# Patient Record
Sex: FEMALE | Race: WHITE | NOT HISPANIC OR LATINO | Employment: UNEMPLOYED | URBAN - METROPOLITAN AREA
[De-identification: names, ages, dates, MRNs, and addresses within clinical notes are randomized per-mention and may not be internally consistent; named-entity substitution may affect disease eponyms.]

---

## 2017-05-04 ENCOUNTER — TELEPHONE (OUTPATIENT)
Dept: TRANSPLANT | Facility: CLINIC | Age: 42
End: 2017-05-04

## 2017-05-04 NOTE — TELEPHONE ENCOUNTER
Left VMM and contact information following up on Ellie's e-mail to arrange appointments when in town in June.

## 2017-05-09 ENCOUNTER — TELEPHONE (OUTPATIENT)
Dept: TRANSPLANT | Facility: CLINIC | Age: 42
End: 2017-05-09

## 2017-05-09 DIAGNOSIS — E13.9 POST-PANCREATECTOMY DIABETES (H): ICD-10-CM

## 2017-05-09 DIAGNOSIS — Z90.410 POST-PANCREATECTOMY DIABETES (H): ICD-10-CM

## 2017-05-09 DIAGNOSIS — E55.9 VITAMIN D DEFICIENCY, UNSPECIFIED: ICD-10-CM

## 2017-05-09 DIAGNOSIS — K90.9 INTESTINAL MALABSORPTION: ICD-10-CM

## 2017-05-09 DIAGNOSIS — K86.81 EXOCRINE PANCREATIC INSUFFICIENCY: Primary | ICD-10-CM

## 2017-05-09 DIAGNOSIS — E89.1 POST-PANCREATECTOMY DIABETES (H): ICD-10-CM

## 2017-05-09 NOTE — TELEPHONE ENCOUNTER
E-mail to Ellie today.    Ellie~    Appointments set for Wednesday June 28th     Please come fasting for your boost test at 8am. Your appointment with Dr Alegre will be at 9: 40 .All appointments in our new clinic building.    There are 3 attachments to this e-mail.    1 Directions to the new clinic  2 Instructions for your boost test  3 Electronic communication release which I am required to have on record.      Best wishes,  Ludmila

## 2017-05-15 ENCOUNTER — TELEPHONE (OUTPATIENT)
Dept: TRANSPLANT | Facility: CLINIC | Age: 42
End: 2017-05-15

## 2017-05-15 NOTE — TELEPHONE ENCOUNTER
Returned Ellie's call. She asked when her future appointments were scheduled for. She did not see an e-mail sent 5/9/17 and requested I resend all information to her. She stated she would look in her spam box.     Hi Ellie,    I have included three attachments in this e-mail.     1. Your appointments for Wednesday, June 28,2017.  2. Electronic communication release which I am required to have on record.  3. Boost testing instructions.    The link below will help you learn about our [facility] ([facility] and directions to help you in finding how to get there.  [email or web address]    Would you please e-mail back so I know you have received this information.     Thank you.    Jennifer Collins RN BSN  Phone [phone]. Pager: [phone] Toll free [phone]. Fax  Transplant Coordinator for Chronic pancreatitis /[facility]  [address] [place] [address] Room [address]. [place] [address]

## 2017-06-27 ENCOUNTER — TELEPHONE (OUTPATIENT)
Dept: TRANSPLANT | Facility: CLINIC | Age: 42
End: 2017-06-27

## 2017-06-27 NOTE — TELEPHONE ENCOUNTER
line([place]) [facility] calling for Ellie;  Ellie's insurance changed and her visits/appt's are not covered.  I cancelled Ellie's appts for tomorrow 06/28/17

## 2017-12-24 ENCOUNTER — HEALTH MAINTENANCE LETTER (OUTPATIENT)
Age: 42
End: 2017-12-24

## 2020-03-10 ENCOUNTER — HEALTH MAINTENANCE LETTER (OUTPATIENT)
Age: 45
End: 2020-03-10

## 2020-12-20 ENCOUNTER — HEALTH MAINTENANCE LETTER (OUTPATIENT)
Age: 45
End: 2020-12-20

## 2021-02-28 ENCOUNTER — HEALTH MAINTENANCE LETTER (OUTPATIENT)
Age: 46
End: 2021-02-28

## 2021-04-24 ENCOUNTER — HEALTH MAINTENANCE LETTER (OUTPATIENT)
Age: 46
End: 2021-04-24

## 2021-08-08 ENCOUNTER — HEALTH MAINTENANCE LETTER (OUTPATIENT)
Age: 46
End: 2021-08-08

## 2021-10-03 ENCOUNTER — HEALTH MAINTENANCE LETTER (OUTPATIENT)
Age: 46
End: 2021-10-03

## 2021-11-28 ENCOUNTER — HEALTH MAINTENANCE LETTER (OUTPATIENT)
Age: 46
End: 2021-11-28

## 2022-02-18 DIAGNOSIS — Z00.6 EXAM FOR CLINICAL RESEARCH: Primary | ICD-10-CM

## 2022-03-20 ENCOUNTER — HEALTH MAINTENANCE LETTER (OUTPATIENT)
Age: 47
End: 2022-03-20

## 2022-04-22 RX ORDER — NICOTINE POLACRILEX 4 MG
15 LOZENGE BUCCAL
Status: CANCELLED | OUTPATIENT
Start: 2022-04-22

## 2022-05-09 ENCOUNTER — ALLIED HEALTH/NURSE VISIT (OUTPATIENT)
Dept: OPHTHALMOLOGY | Facility: CLINIC | Age: 47
End: 2022-05-09
Attending: OPHTHALMOLOGY

## 2022-05-09 ENCOUNTER — HOSPITAL ENCOUNTER (OUTPATIENT)
Dept: RESEARCH | Facility: CLINIC | Age: 47
Discharge: HOME | End: 2022-05-09
Attending: PEDIATRICS

## 2022-05-09 ENCOUNTER — LAB REQUISITION (OUTPATIENT)
Dept: LAB | Facility: CLINIC | Age: 47
End: 2022-05-09

## 2022-05-09 ENCOUNTER — OFFICE VISIT (OUTPATIENT)
Dept: NEUROLOGY | Facility: CLINIC | Age: 47
End: 2022-05-09

## 2022-05-09 DIAGNOSIS — E89.1 POST-PANCREATECTOMY DIABETES (H): ICD-10-CM

## 2022-05-09 DIAGNOSIS — E10.65 TYPE 1 DIABETES MELLITUS WITH HYPERGLYCEMIA (H): ICD-10-CM

## 2022-05-09 DIAGNOSIS — E89.1 POST-PANCREATECTOMY DIABETES (H): Primary | ICD-10-CM

## 2022-05-09 DIAGNOSIS — E13.9 POST-PANCREATECTOMY DIABETES (H): ICD-10-CM

## 2022-05-09 DIAGNOSIS — Z00.6 EXAMINATION OF PARTICIPANT OR CONTROL IN CLINICAL RESEARCH: Primary | ICD-10-CM

## 2022-05-09 DIAGNOSIS — Z90.410 POST-PANCREATECTOMY DIABETES (H): ICD-10-CM

## 2022-05-09 DIAGNOSIS — E13.9 POST-PANCREATECTOMY DIABETES (H): Primary | ICD-10-CM

## 2022-05-09 DIAGNOSIS — Z90.410 POST-PANCREATECTOMY DIABETES (H): Primary | ICD-10-CM

## 2022-05-09 DIAGNOSIS — Z00.6 EXAM FOR CLINICAL RESEARCH: Primary | ICD-10-CM

## 2022-05-09 LAB
ALBUMIN SERPL-MCNC: 3.8 G/DL (ref 3.4–5)
ALP SERPL-CCNC: 85 U/L (ref 40–150)
ALT SERPL W P-5'-P-CCNC: 58 U/L (ref 0–50)
ANION GAP SERPL CALCULATED.3IONS-SCNC: 5 MMOL/L (ref 3–14)
AST SERPL W P-5'-P-CCNC: 30 U/L (ref 0–45)
BILIRUB SERPL-MCNC: 0.7 MG/DL (ref 0.2–1.3)
BUN SERPL-MCNC: 12 MG/DL (ref 7–30)
CALCIUM SERPL-MCNC: 9.5 MG/DL (ref 8.5–10.1)
CHLORIDE BLD-SCNC: 110 MMOL/L (ref 94–109)
CO2 SERPL-SCNC: 26 MMOL/L (ref 20–32)
CREAT SERPL-MCNC: 0.6 MG/DL (ref 0.52–1.04)
ERYTHROCYTE [DISTWIDTH] IN BLOOD BY AUTOMATED COUNT: 12.9 % (ref 10–15)
GFR SERPL CREATININE-BSD FRML MDRD: >90 ML/MIN/1.73M2
GLUCOSE BLD-MCNC: 156 MG/DL (ref 70–99)
HCT VFR BLD AUTO: 43.3 % (ref 35–47)
HGB BLD-MCNC: 14.3 G/DL (ref 11.7–15.7)
MCH RBC QN AUTO: 31 PG (ref 26.5–33)
MCHC RBC AUTO-ENTMCNC: 33 G/DL (ref 31.5–36.5)
MCV RBC AUTO: 94 FL (ref 78–100)
PLATELET # BLD AUTO: 376 10E3/UL (ref 150–450)
POTASSIUM BLD-SCNC: 4 MMOL/L (ref 3.4–5.3)
PROT SERPL-MCNC: 7.6 G/DL (ref 6.8–8.8)
RBC # BLD AUTO: 4.62 10E6/UL (ref 3.8–5.2)
SODIUM SERPL-SCNC: 141 MMOL/L (ref 133–144)
WBC # BLD AUTO: 6.2 10E3/UL (ref 4–11)

## 2022-05-09 PROCEDURE — 510N000019 HC RESEARCH MMTT, PER HOUR

## 2022-05-09 PROCEDURE — 510N000017 HC CRU PATIENT CARE, PER 15 MIN

## 2022-05-09 PROCEDURE — 99207 PR NO CHARGE COORDINATED CARE PS: CPT

## 2022-05-09 PROCEDURE — 99207 PR SATISFY VISIT NUMBER: CPT | Performed by: PSYCHIATRY & NEUROLOGY

## 2022-05-09 PROCEDURE — 250N000012 HC RX MED GY IP 250 OP 636 PS 637: Performed by: PEDIATRICS

## 2022-05-09 PROCEDURE — 96374 THER/PROPH/DIAG INJ IV PUSH: CPT

## 2022-05-09 PROCEDURE — 80053 COMPREHEN METABOLIC PANEL: CPT | Performed by: PEDIATRICS

## 2022-05-09 PROCEDURE — 96375 TX/PRO/DX INJ NEW DRUG ADDON: CPT

## 2022-05-09 PROCEDURE — 510N000016 HC RESEARCH MEALS, PER MEAL

## 2022-05-09 PROCEDURE — 85027 COMPLETE CBC AUTOMATED: CPT | Performed by: PEDIATRICS

## 2022-05-09 PROCEDURE — 510N000009 HC RESEARCH FACILITY, PER 15 MIN

## 2022-05-09 RX ORDER — LANCETS
EACH MISCELLANEOUS
Qty: 200 EACH | Refills: 1 | Status: SHIPPED | OUTPATIENT
Start: 2022-05-09

## 2022-05-09 RX ORDER — NICOTINE POLACRILEX 4 MG
15 LOZENGE BUCCAL
Status: DISCONTINUED | OUTPATIENT
Start: 2022-05-09 | End: 2022-05-10 | Stop reason: HOSPADM

## 2022-05-09 RX ADMIN — HUMAN INSULIN 3 UNITS: 100 INJECTION, SOLUTION SUBCUTANEOUS at 08:12

## 2022-05-09 NOTE — PATIENT INSTRUCTIONS
We are aiming to have your blood sugar between  mg/dL tomorrow morning for the start of the mixed meal testing.    Here is our plan for today:  1)  Take 16 units of your Basaglar at your usual time tonight.  2)  Just before your bedtime snack, take your blood sugar and use the correction scale below if your number is above 200 mg/dL.    3)  Recheck your blood sugar about 4 hours later and use this scale again if your blood sugar is above 200 mg/dL.    Here is the scale for the fast acting (novolog/humalog):  Blood sugar 200- 250 mg/dL take 1 unit  Blood sugar 251-350 mg/dL, take 2 units  Blood sugar >350 mg/dL, take 3 units

## 2022-05-09 NOTE — LETTER
2022       RE: Ellie Paul  [address]  [place] [address]     Dear Colleague,    Thank you for referring your patient, Ellie Paul, to the [facility] [place] at [place] [facility]. Please see a copy of my visit note below.                        [facility]  Electrodiagnostic Laboratory                 Department of Neurology                                                                                                         Test Date:  2022    Patient: Ellie Paul : 1975 Physician: Daniel Hernandez MD   Sex: Female AGE: 46 year Ref Phys: LIFT study   ID#: [id number]   Technician: none     Clinical Information:  Limited nerve conduction studies performed as part of the LIFT study. No formal report generated.       ___________________________  Daniel Hernandez MD        Nerve Conduction Studies  Motor Sites      Latency Amplitude Neg. Amp Diff Segment Distance Velocity Neg. Dur Neg Area Diff Temperature Comment   Site (ms) Norm (mV) Norm %  cm m/s Norm ms %  C    Right Fibular (EDB) Motor   Ankle 4.3  < 6.0 4.2  > 2.5  Ankle-EDB 8   6.0  30.9    Bel Fib Head 10.7 - 3.5 - -16.7 Bel Fib Head-Ankle 28 44  > 38 6.9 -6.1 31.1    Pop Fossa 12.3 - 2.7 - -22.9 Pop Fossa-Bel Fib Head 8 50  > 38 6.4 -24.4 31.2    Right Ulnar (ADM) Motor   Wrist 2.5  < 3.5 7.4  > 5.0  Wrist-ADM 8   6.2  29.8    Bel Elbow 5.9 - 6.6 - -10.8 Bel Elbow-Wrist 18 53  > 48 - - 29.2    Abv Elbow 7.0 - 7.2 - 9.1 Abv Elbow-Bel Elbow 6 55  > 48 - - 29.7      Sensory Sites      Onset Lat Peak Lat Amp (O-P) Amp (P-P) Segment Distance Velocity Temperature Comment   Site ms ms  V Norm  V  cm m/s Norm  C    Right Radial Sensory   Forearm-Wrist 1.55 2.0 21  > 15 54 Forearm-Wrist 10 65 - 30.6    Right Sural Sensory   Calf-Lat Mall 2.6 3.4 40  > 5 46 Calf-Lat Mall 14 54  > 38 31.5    Right Ulnar Sensory   Wrist-Dig V 1.78 2.5 36  > 8 57 Wrist-Dig V  12.5 70  > 48 31.5            NCS Waveforms:    Motor         Sensory               Sincerely,    Daniel Hernandez MD

## 2022-05-09 NOTE — PROGRESS NOTES
[facility]  Electrodiagnostic Laboratory                 Department of Neurology                                                                                                         Test Date:  2022    Patient: Ellie Paul : 1975 Physician: Daniel Hernandez MD   Sex: Female AGE: 46 year Ref Phys: LIFT study   ID#: [id number]   Technician: none     Clinical Information:  Limited nerve conduction studies performed as part of the LIFT study. No formal report generated.       ___________________________  Daniel Hernandez MD        Nerve Conduction Studies  Motor Sites      Latency Amplitude Neg. Amp Diff Segment Distance Velocity Neg. Dur Neg Area Diff Temperature Comment   Site (ms) Norm (mV) Norm %  cm m/s Norm ms %  C    Right Fibular (EDB) Motor   Ankle 4.3  < 6.0 4.2  > 2.5  Ankle-EDB 8   6.0  30.9    Bel Fib Head 10.7 - 3.5 - -16.7 Bel Fib Head-Ankle 28 44  > 38 6.9 -6.1 31.1    Pop Fossa 12.3 - 2.7 - -22.9 Pop Fossa-Bel Fib Head 8 50  > 38 6.4 -24.4 31.2    Right Ulnar (ADM) Motor   Wrist 2.5  < 3.5 7.4  > 5.0  Wrist-ADM 8   6.2  29.8    Bel Elbow 5.9 - 6.6 - -10.8 Bel Elbow-Wrist 18 53  > 48 - - 29.2    Abv Elbow 7.0 - 7.2 - 9.1 Abv Elbow-Bel Elbow 6 55  > 48 - - 29.7      Sensory Sites      Onset Lat Peak Lat Amp (O-P) Amp (P-P) Segment Distance Velocity Temperature Comment   Site ms ms  V Norm  V  cm m/s Norm  C    Right Radial Sensory   Forearm-Wrist 1.55 2.0 21  > 15 54 Forearm-Wrist 10 65 - 30.6    Right Sural Sensory   Calf-Lat Mall 2.6 3.4 40  > 5 46 Calf-Lat Mall 14 54  > 38 31.5    Right Ulnar Sensory   Wrist-Dig V 1.78 2.5 36  > 8 57 Wrist-Dig V 12.5 70  > 48 31.5            NCS Waveforms:    Motor         Sensory

## 2022-05-10 ENCOUNTER — HOSPITAL ENCOUNTER (OUTPATIENT)
Dept: RESEARCH | Facility: CLINIC | Age: 47
Discharge: HOME | End: 2022-05-10
Attending: PEDIATRICS

## 2022-05-10 ENCOUNTER — LAB REQUISITION (OUTPATIENT)
Dept: LAB | Facility: CLINIC | Age: 47
End: 2022-05-10

## 2022-05-10 DIAGNOSIS — E13.9 POST-PANCREATECTOMY DIABETES (H): Primary | ICD-10-CM

## 2022-05-10 DIAGNOSIS — Z90.410 POST-PANCREATECTOMY DIABETES (H): Primary | ICD-10-CM

## 2022-05-10 DIAGNOSIS — E89.1 POST-PANCREATECTOMY DIABETES (H): Primary | ICD-10-CM

## 2022-05-10 LAB
CREAT UR-MCNC: 146 MG/DL
CREAT UR-MCNC: 146 MG/DL
MICROALBUMIN UR-MCNC: 10 MG/L
MICROALBUMIN/CREAT UR: 6.85 MG/G CR (ref 0–25)

## 2022-05-10 PROCEDURE — 82043 UR ALBUMIN QUANTITATIVE: CPT | Performed by: PEDIATRICS

## 2022-05-10 PROCEDURE — 82570 ASSAY OF URINE CREATININE: CPT | Performed by: PEDIATRICS

## 2022-05-10 PROCEDURE — 510N000019 HC RESEARCH MMTT, PER HOUR

## 2022-05-10 PROCEDURE — 510N000009 HC RESEARCH FACILITY, PER 15 MIN

## 2022-05-10 PROCEDURE — 510N000017 HC CRU PATIENT CARE, PER 15 MIN

## 2022-05-10 PROCEDURE — 510N000016 HC RESEARCH MEALS, PER MEAL

## 2022-05-15 ENCOUNTER — HEALTH MAINTENANCE LETTER (OUTPATIENT)
Age: 47
End: 2022-05-15

## 2022-05-16 NOTE — ADDENDUM NOTE
Encounter addended by: Marguerite Tucker on: 5/16/2022 2:11 PM   Actions taken: Charge Capture section accepted

## 2022-05-19 NOTE — ADDENDUM NOTE
Encounter addended by: Brian Lowry RN on: 5/19/2022 11:23 AM   Actions taken: Charge Capture section accepted

## 2022-07-10 ENCOUNTER — HEALTH MAINTENANCE LETTER (OUTPATIENT)
Age: 47
End: 2022-07-10

## 2022-09-10 ENCOUNTER — HEALTH MAINTENANCE LETTER (OUTPATIENT)
Age: 47
End: 2022-09-10

## 2023-01-22 ENCOUNTER — HEALTH MAINTENANCE LETTER (OUTPATIENT)
Age: 48
End: 2023-01-22

## 2023-04-30 ENCOUNTER — HEALTH MAINTENANCE LETTER (OUTPATIENT)
Age: 48
End: 2023-04-30

## 2023-06-03 ENCOUNTER — HEALTH MAINTENANCE LETTER (OUTPATIENT)
Age: 48
End: 2023-06-03

## 2023-10-01 ENCOUNTER — HEALTH MAINTENANCE LETTER (OUTPATIENT)
Age: 48
End: 2023-10-01

## 2024-02-18 ENCOUNTER — HEALTH MAINTENANCE LETTER (OUTPATIENT)
Age: 49
End: 2024-02-18

## 2024-04-28 ENCOUNTER — HEALTH MAINTENANCE LETTER (OUTPATIENT)
Age: 49
End: 2024-04-28

## 2024-07-07 ENCOUNTER — HEALTH MAINTENANCE LETTER (OUTPATIENT)
Age: 49
End: 2024-07-07

## 2024-11-24 ENCOUNTER — HEALTH MAINTENANCE LETTER (OUTPATIENT)
Age: 49
End: 2024-11-24

## 2025-03-09 ENCOUNTER — HEALTH MAINTENANCE LETTER (OUTPATIENT)
Age: 50
End: 2025-03-09